# Patient Record
Sex: FEMALE | Race: WHITE
[De-identification: names, ages, dates, MRNs, and addresses within clinical notes are randomized per-mention and may not be internally consistent; named-entity substitution may affect disease eponyms.]

---

## 2020-11-12 ENCOUNTER — TRANSCRIPTION ENCOUNTER (OUTPATIENT)
Age: 25
End: 2020-11-12

## 2021-09-03 ENCOUNTER — EMERGENCY (EMERGENCY)
Facility: HOSPITAL | Age: 26
LOS: 0 days | Discharge: HOME | End: 2021-09-03
Attending: STUDENT IN AN ORGANIZED HEALTH CARE EDUCATION/TRAINING PROGRAM
Payer: OTHER MISCELLANEOUS

## 2021-09-03 VITALS
OXYGEN SATURATION: 98 % | RESPIRATION RATE: 16 BRPM | HEART RATE: 64 BPM | DIASTOLIC BLOOD PRESSURE: 82 MMHG | TEMPERATURE: 98 F | SYSTOLIC BLOOD PRESSURE: 138 MMHG

## 2021-09-03 VITALS — HEIGHT: 65 IN | WEIGHT: 199.96 LBS

## 2021-09-03 DIAGNOSIS — S01.111A LACERATION WITHOUT FOREIGN BODY OF RIGHT EYELID AND PERIOCULAR AREA, INITIAL ENCOUNTER: ICD-10-CM
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
## 2021-09-03 PROCEDURE — 99284 EMERGENCY DEPT VISIT MOD MDM: CPT

## 2021-09-03 PROCEDURE — 12051 INTMD RPR FACE/MM 2.5 CM/<: CPT

## 2021-09-03 PROCEDURE — 99284 EMERGENCY DEPT VISIT MOD MDM: CPT | Mod: 25

## 2021-09-03 RX ADMIN — Medication 1 TABLET(S): at 18:30

## 2021-09-03 NOTE — ED STATDOCS - PATIENT PORTAL LINK FT
You can access the FollowMyHealth Patient Portal offered by [facility] by registering at the following website: [email or web address]. By joining [employer]’s FollowMyHealth portal, you will also be able to view your health information using other applications (apps) compatible with our system.

## 2021-09-03 NOTE — ED ADULT NURSE REASSESSMENT NOTE - NS ED NURSE REASSESS COMMENT FT1
handoff report taken from day RN Marvin. pt. noted resting comfortably in stretcher. no needs at this time. No distress noted, denies pain, safety maintained. WCLITO

## 2021-09-03 NOTE — ED STATDOCS - CARE PLAN
Principal Discharge DX:	Dog scratch  Secondary Diagnosis:	Laceration, eyelid, right  Secondary Diagnosis:	Abrasion of face   1

## 2021-09-03 NOTE — ED STATDOCS - SKIN, MLM
+abrasion to forehead, +3cm laceration on right eyelid, vertical +abrasion to forehead, +2cm laceration on right upper eyelid.

## 2021-09-03 NOTE — ED STATDOCS - NORMAL, MLM
From: Ge Maya  To: Mary Jo Mo MD  Sent: 1/24/2019 9:37 AM CST  Subject: Other    Good Morning! I am writing because I have a question. Im having a lot of pain in my right quad that goes into my groin. I'm hoping I just pulled a muscle now that I'm exercising, but I'm scared to death it's my hip replacement. Should I be worried? Any advice?    thanks kayla all pertinent systems normal

## 2021-09-03 NOTE — ED STATDOCS - OBJECTIVE STATEMENT
27 y/o female with no pertinent PMHx presents to the ED c/o abrasion to forehead and laceration right eyelid from dog while at work. Pt reports dogs vaccinations were UTD. Vaccinations UTD. [facility]: [facility] [employer].

## 2021-09-03 NOTE — ED STATDOCS - CLINICAL SUMMARY MEDICAL DECISION MAKING FREE TEXT BOX
25 y/o female with facial laceration. Plastic surgery consult. 25 y/o female with facial laceration. Plastic surgery consult.      PA note: LAC repaired by Dr. Houston. Patient re-examined and re-evaluated. Patient feels much better at this time. ED evaluation, Diagnosis and management discussed with the patient in detail. Workup results discussed with ED attending, OK to dc home. Close PMD follow up encouraged.  Strict ED return instructions discussed in detail and patient given the opportunity to ask any questions about their discharge diagnosis and instructions. Patient verbalized understanding. ~ Sanjiv Magallanes PA-C

## 2021-09-03 NOTE — ED STATDOCS - PROGRESS NOTE DETAILS
PA: Patient is a 25 y/o female with no pertinent PMHx who presents to [facility] c/o abrasion to forehead and laceration right eyelid from dog while at work. Pt reports dogs vaccinations were UTD. Vaccinations UTD. [facility] [employer]. Spoke with plastics Dr. Houston, will see patient in ED shortly. ~Sanjiv Magallanes PA-C PA note: LAC repaired by Dr. Houston. Patient re-examined and re-evaluated. Patient feels much better at this time. ED evaluation, Diagnosis and management discussed with the patient in detail. Workup results discussed with ED attending, OK to IA home. Close PMD follow up encouraged.  Strict ED return instructions discussed in detail and patient given the opportunity to ask any questions about their discharge diagnosis and instructions. Patient verbalized understanding. ~ Sanjiv Magallanes PA-C

## 2021-09-03 NOTE — ED STATDOCS - ATTENDING CONTRIBUTION TO CARE
I, Courtney Trammell DO,  performed the initial face to face bedside interview with this patient regarding history of present illness, review of symptoms and relevant past medical, social and family history.  I completed an independent physical examination.  I was the initial provider who evaluated this patient. I have signed out the follow up of any pending tests (i.e. labs, radiological studies) to the ACP.  I have communicated the patient’s plan of care and disposition with the ACP.  The history, relevant review of systems, past medical and surgical history, medical decision making, and physical examination was documented by the scribe in my presence and I attest to the accuracy of the documentation.

## 2021-09-03 NOTE — ED ADULT TRIAGE NOTE - CHIEF COMPLAINT QUOTE
Pt presents to ED for abrasion above right eye and right forehead from dog while I work. Pt reports dogs vaccinations were utd. Bleeding controlled in ED

## 2021-09-03 NOTE — ED STATDOCS - CARE PROVIDER_API CALL
Juan Miguel Parker J  PLASTIC SURGERY  [address], [address]  [place] [address]  Phone: [phone]  Fax: [phone]  Follow Up Time:

## 2021-09-03 NOTE — ED STATDOCS - NSFOLLOWUPINSTRUCTIONS_ED_ALL_ED_FT
FACIAL LACERATION - Ambulatory Care           Facial Laceration    AMBULATORY CARE:    A facial laceration is a tear or cut in the skin. Facial lacerations may be closed within 24 hours of injury.    Seek care immediately if:   •You have a fever and the wound is painful, warm, or swollen. The wound area may be red, or fluid may come out of it.      •You have heavy bleeding or bleeding that does not stop after 10 minutes of holding firm, direct pressure over the wound.      Call your doctor if:   •Your wound reopens or your tape comes off.      •Your wound is very painful.      •Your wound is not healing, or you think there is an object in the wound.      •The skin around your wound stays numb.      •You have questions or concerns about your condition or care.      Medicines:   •Antibiotics may be given to prevent an infection if your wound was deep and had to be cleaned out.       •Take your medicine as directed. Contact your healthcare provider if you think your medicine is not helping or if you have side effects. Tell him of her if you are allergic to any medicine. Keep a list of the medicines, vitamins, and herbs you take. Include the amounts, and when and why you take them. Bring the list or the pill bottles to follow-up visits. Carry your medicine list with you in case of an emergency.      Care for your wound: Care for your wound as directed to prevent infection and help it heal. Wash your hands with soap and warm water before and after you care for your wound. You may need to keep the wound dry for the first 24 to 48 hours. When your healthcare provider says it is okay, wash around your wound with soap and water, or as directed. Gently pat the area dry. Do not use alcohol or hydrogen peroxide to clean your wound unless you are directed to.  •Do not take aspirin or NSAIDs for 24 hours after being injured. Aspirin and NSAIDs can increase blood flow. Your laceration may continue to bleed.      •Do not take hot showers, eat or drink hot foods and liquids for 48 hours after being injured. Also, do not use a heating pad near your laceration. The heat can cause swelling in and around your laceration.      •If your wound was covered with a bandage, leave your bandage on as long as directed. Bandages keep your wound clean and protected. They can also prevent swelling. Ask when and how to change your bandage. Be careful not to apply the bandage or tape too tightly. This could cut off blood flow and cause more injury.      •If your wound was closed with stitches, keep your wound clean. Your healthcare provider may recommend that you apply antibiotic ointment after you clean your wound.      •If your wound was closed with wound tape or medical strips, keep the area clean and dry. The strips will usually fall off on their own after several days.      •If your wound was closed with tissue glue, do not use any ointments or lotions on the area. You may shower, but do not swim or soak in a bathtub. Gently pat the area dry after you take a shower. Do not pick at or scrub the glue area.      Decrease scarring: The skin in the area of your wound may turn a different color if it is exposed to direct sunlight. After your wound is healed, use sunscreen over the area when you are out in the sun. You should do this for at least 6 months to 1 year after your injury. Some wounds scar less if they are covered while they heal.    Follow up with your doctor as directed: You may need to follow up in 24 to 48 hours to have your wound checked for infection. You may need to return in 3 to 5 days if you have stitches that need to be removed. Write down your questions so you remember to ask them during your visits.                  Log Out.      [employer] CareNotes®     :  [facility]  	                       ABRASION - General Information           Abrasion    WHAT YOU NEED TO KNOW:    What is an abrasion? An abrasion is a wound on your skin. Abrasions usually happen when your skin rubs against a rough surface. Examples of an abrasion include rug burn, a skinned elbow, or road rash. Abrasions can be deep or shallow The wound may hurt, bleed, bruise, or swell.    How can I care for my abrasion?   •Wash your hands and dry them with a clean towel first.      •Press a clean cloth against your wound for 5 to 10 minutes to stop any bleeding.      •Rinse your wound with clean water. Do not use harsh soap, alcohol, or iodine solutions.      •Use a clean, wet cloth to remove any objects, such as small pieces of rocks or dirt.      •Rub antibiotic ointment on your wound. This may help prevent infection and help your wound heal.      •Cover the wound with a non-stick bandage. Change the bandage daily, and if it gets wet or dirty.       When should I seek immediate care?   •The bleeding does not stop after 10 minutes of firm pressure.      •The redness around your wound begins to spread.      •You cannot rinse one or more foreign objects out of your wound.      When should I call my doctor?   •You have a fever or chills.       •Your abrasion is red, warm, swollen, or draining pus.      •You have questions or concerns about your condition or care.      CARE AGREEMENT:    You have the right to help plan your care. Learn about your health condition and how it may be treated. Discuss treatment options with your healthcare providers to decide what care you want to receive. You always have the right to refuse treatment.

## 2021-09-03 NOTE — ED STATDOCS - PHYSICAL EXAMINATION
PA NOTE: GEN: AOX3, NAD. HEENT: Throat clear. Airway is patent. EYES: PERRLA. EOMI. Head: NC/AT. FACE: +2.0cm superficial linear LAC noted right upper eyelid. No active bleeding. No obvious eye injury. No eye FB's. NECK: Supple, No JVD. FROM. C-spine non-tender. CV:S1S2, RRR, LUNGS: Non-labored breathing, no tachypnea. O2sat 100% RA. CTA b/l. No w/r/r. CHEST: Equal chest expansion and rise. No deformity. ABD: Soft, NT/ND, no rebound, no guarding. No CVAT. EXT: No e/c/c. 2+ distal pulses. SKIN: No rashes. NEURO: No focal deficits. CN II-XII intact. FROM. 5/5 motor and sensory. ~Sanjiv Magallanes PA-C

## 2023-07-12 ENCOUNTER — EMERGENCY (EMERGENCY)
Facility: HOSPITAL | Age: 28
LOS: 0 days | Discharge: HOME | End: 2023-07-12
Attending: EMERGENCY MEDICINE
Payer: COMMERCIAL

## 2023-07-12 VITALS — HEIGHT: 65 IN | WEIGHT: 175.05 LBS

## 2023-07-12 VITALS
DIASTOLIC BLOOD PRESSURE: 90 MMHG | RESPIRATION RATE: 18 BRPM | SYSTOLIC BLOOD PRESSURE: 130 MMHG | TEMPERATURE: 98 F | OXYGEN SATURATION: 100 % | HEART RATE: 79 BPM
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
## 2023-07-12 DIAGNOSIS — S51.831A PUNCTURE WOUND WITHOUT FOREIGN BODY OF RIGHT FOREARM, INITIAL ENCOUNTER: ICD-10-CM
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
## 2023-07-12 PROCEDURE — 99283 EMERGENCY DEPT VISIT LOW MDM: CPT

## 2023-07-12 PROCEDURE — 99284 EMERGENCY DEPT VISIT MOD MDM: CPT

## 2023-07-12 RX ADMIN — Medication 1 TABLET(S): at 18:46

## 2023-07-12 NOTE — ED STATDOCS - CARE PROVIDER_API CALL
Melvin Cole.  Orthopaedic Surgery  [address] [place] [address]  Phone: [phone]  Fax: [phone]  Follow Up Time:

## 2023-07-12 NOTE — ED ADULT NURSE NOTE - NSFALLUNIVINTERV_ED_ALL_ED
Bed/Stretcher in lowest position, wheels locked, appropriate side rails in place/Call bell, personal items and telephone in reach/Instruct patient to call for assistance before getting out of bed/chair/stretcher/Non-slip footwear applied when patient is off stretcher/[place] to call system/Physically safe environment - no spills, clutter or unnecessary equipment/Purposeful proactive rounding/Room/bathroom lighting operational, light cord in reach

## 2023-07-12 NOTE — ED STATDOCS - PATIENT PORTAL LINK FT
Owen SMITH
You can access the FollowMyHealth Patient Portal offered by [facility] by registering at the following website: [email or web address]. By joining [employer]’s FollowMyHealth portal, you will also be able to view your health information using other applications (apps) compatible with our system.

## 2023-07-12 NOTE — ED STATDOCS - NSFOLLOWUPINSTRUCTIONS_ED_ALL_ED_FT
FOLLOW UP WITH THE ORTHOPEDIC DOCTOR AS NEEDED. CALL THE OFFICE TO MAKE AN APPOINTMENT. RETURN TO ER FOR ANY WORSENING SYMPTOMS OR NEW CONCERNS.       Animal Bite    WHAT YOU NEED TO KNOW:    Animal bite injuries range from shallow cuts to deep, life-threatening wounds. An animal can cut or puncture the skin when it bites. Your skin may be torn from your body. Your skin may swell or bruise even if the bite does not break the skin. Animal bites occur more often on the hands, arms, legs, and face. Bites from dogs and cats are the most common injuries.    DISCHARGE INSTRUCTIONS:    Return to the emergency department if:     You have a fever.      Your wound is red, swollen, and draining pus.      You see red streaks on the skin around the wound.      You can no longer move the bitten area.      Your heartbeat and breathing are much faster than usual.      You feel dizzy and confused.    Contact your healthcare provider if:     Your pain does not get better, even after you take pain medicine.      You have nightmares or flashbacks about the animal bite.       You have questions or concerns about your condition or care.    Medicines: You may need any of the following:     Antibiotics prevent or treat a bacterial infection.      Prescription pain medicine may be given. Ask how to take this medicine safely.      A tetanus vaccine may be needed to prevent tetanus. Tetanus is a life-threatening bacterial infection that affects the nerves and muscles. The bacteria can be spread through animal bites.       A rabies vaccine may be needed to prevent rabies. Rabies is a life-threatening viral infection. The virus can be spread through animal bites.      Take your medicine as directed. Contact your healthcare provider if you think your medicine is not helping or if you have side effects. Tell him of her if you are allergic to any medicine. Keep a list of the medicines, vitamins, and herbs you take. Include the amounts, and when and why you take them. Bring the list or the pill bottles to follow-up visits. Carry your medicine list with you in case of an emergency.    Follow up with your healthcare provider in 1 to 2 days: You may need to return to have your stitches removed. Write down your questions so you remember to ask them during your visits.    Self-care:     Apply antibiotic ointment as directed. This helps prevent infection in minor skin wounds. It is available without a doctor's order.      Keep the wound clean and covered. Wash the wound every day with soap and water or germ-killing cleanser. Ask your healthcare provider about the kinds of bandages to use.            Apply ice on your wound. Ice helps decrease swelling and pain. Ice may also help prevent tissue damage. Use an ice pack, or put crushed ice in a plastic bag. Cover it with a towel and place it on your wound for 15 to 20 minutes every hour or as directed.      Elevate the wound area. Raise your wound above the level of your heart as often as you can. This will help decrease swelling and pain. Prop your wound on pillows or blankets to keep it elevated comfortably.     Prevent another animal bite:     Learn to recognize the signs of a scared or angry pet. Avoid quick, sudden movements.      Do not step between animals that are fighting.      Do not leave a pet alone with a young child.      Do not disturb an animal while it eats, sleeps, or cares for its young.      Do not approach an animal you do not know, especially one that is tied up or caged.      Stay away from animals that seem sick or act strangely.      Do not feed or capture wild animals.

## 2023-07-12 NOTE — ED ADULT TRIAGE NOTE - CHIEF COMPLAINT QUOTE
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 Dog UTD on vaccines. Washed bite with chlorhexidine scrub. No other complaints.

## 2023-07-12 NOTE — ED STATDOCS - CLINICAL SUMMARY MEDICAL DECISION MAKING FREE TEXT BOX
signed Amanda Hernández PA-C Pt seen initially in intake by Dr Streeter.   28F with dog bit to right forearm while she was restraining a stressed [place] [employer] dog at work [employer]. Pt with puncture wounds of forearm, no suturing indicated. wounds cleansed in ED, abx ppx. return precautions given. Pt feeling well at DC, agrees with DC and plan of care.

## 2023-07-12 NOTE — ED STATDOCS - OBJECTIVE STATEMENT
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 Dog's immunizations UTD. Tetanus shot UTD. NKDA.

## 2023-07-12 NOTE — ED STATDOCS - NS ED ATTENDING STATEMENT MOD
This was a shared visit with the TUNG. I reviewed and verified the documentation and independently performed the documented:

## 2023-07-13 PROBLEM — Z78.9 OTHER SPECIFIED HEALTH STATUS: Chronic | Status: ACTIVE | Noted: 2021-09-04

## 2024-09-16 ENCOUNTER — APPOINTMENT (OUTPATIENT)
Dept: OBGYN | Facility: CLINIC | Age: 29
End: 2024-09-16
Payer: COMMERCIAL

## 2024-09-16 VITALS
HEIGHT: 65 IN | WEIGHT: 170 LBS | BODY MASS INDEX: 28.32 KG/M2 | DIASTOLIC BLOOD PRESSURE: 72 MMHG | SYSTOLIC BLOOD PRESSURE: 110 MMHG

## 2024-09-16 DIAGNOSIS — Z78.9 OTHER SPECIFIED HEALTH STATUS: ICD-10-CM

## 2024-09-16 DIAGNOSIS — M05.9 RHEUMATOID ARTHRITIS WITH RHEUMATOID FACTOR, UNSPECIFIED: ICD-10-CM

## 2024-09-16 DIAGNOSIS — E28.2 POLYCYSTIC OVARIAN SYNDROME: ICD-10-CM

## 2024-09-16 DIAGNOSIS — Z80.8 FAMILY HISTORY OF MALIGNANT NEOPLASM OF OTHER ORGANS OR SYSTEMS: ICD-10-CM

## 2024-09-16 DIAGNOSIS — Z01.419 ENCOUNTER FOR GYNECOLOGICAL EXAMINATION (GENERAL) (ROUTINE) W/OUT ABNORMAL FINDINGS: ICD-10-CM

## 2024-09-16 DIAGNOSIS — Z82.79 FAMILY HISTORY OF OTHER CONGENITAL MALFORMATIONS, DEFORMATIONS AND CHROMOSOMAL ABNORMALITIES: ICD-10-CM

## 2024-09-16 DIAGNOSIS — Z82.0 FAMILY HISTORY OF EPILEPSY AND OTHER DISEASES OF THE NERVOUS SYSTEM: ICD-10-CM

## 2024-09-16 DIAGNOSIS — Z80.52 FAMILY HISTORY OF MALIGNANT NEOPLASM OF BLADDER: ICD-10-CM

## 2024-09-16 DIAGNOSIS — Z82.5 FAMILY HISTORY OF ASTHMA AND OTHER CHRONIC LOWER RESPIRATORY DISEASES: ICD-10-CM

## 2024-09-16 PROCEDURE — 99385 PREV VISIT NEW AGE 18-39: CPT

## 2024-09-16 PROCEDURE — 99459 PELVIC EXAMINATION: CPT

## 2024-09-16 NOTE — HISTORY OF PRESENT ILLNESS
[IUD] : has an intrauterine device [Y] : Patient is sexually active [N] : Patient denies prior pregnancies [Menarche Age: ____] : age at menarche was [unfilled] [FreeTextEntry1] : 29 y.o P0 (LMP 7/2024- Jorge) presenting for gyn annual  Patient overall doing well. No acute GYN complaints today.  [facility] by [facility] 2021 for [facility].  Jorge IUD was placed at that time.  Reports irregular spotting related to IUD, denies regular menses.  Denies any pelvic cramping pain or urinary complaints.  Sexually active with male partner-he lives in [place].  Screening: - Pap - unsure  ObHx: denies GynHx: Hx of PCOS. Denies hx of fibroids, hx of endometriosis, hx of STD's PMH: PCOS, rheumatoid arthritis  PSH: Right ankle & left foot surgery  ALL: NKDA SocHx: Lives with family. Feels safe at home. Denies depression or anxiety related symptoms. Social ETOH use. Never used tobacco products, denies illicit drug use. FamHx: Melanoma-mother, maternal aunt-bladder cancer, Father-alzheimers [de-identified] : liletta 05/13/2021 [PGHxTotal] : 0 [PGHxFullTerm] : 0 [PGHxPremature] : 0 [PGHxAbortions] : 0 [[facility] Center[facility]Living] : 0 [PGHxABInduced] : 0 [PGHxABSpont] : 0 [PGHxEctopic] : 0 [PGHxMultBirths] : 0

## 2024-09-16 NOTE — PHYSICAL EXAM
[Chaperone Present] : A chaperone was present in the examining room during all aspects of the physical examination [70311] : A chaperone was present during the pelvic exam. [Appropriately responsive] : appropriately responsive [Alert] : alert [No Acute Distress] : no acute distress [Soft] : soft [Non-tender] : non-tender [No Lesions] : no lesions [No Mass] : no mass [Oriented x3] : oriented x3 [Examination Of The Breasts] : a normal appearance [No Masses] : no breast masses were palpable [Labia Majora] : normal [Labia Minora] : normal [IUD String] : an IUD string was noted [Normal] : normal [Uterine Adnexae] : normal

## 2024-09-16 NOTE — DISCUSSION/SUMMARY
[FreeTextEntry1] : 29 y.o P0 (LMP 7/2024- Jorge) presenting for gyn annual  PCP: Referral placed   #Well Woman Visit  1. Nutrition/Activity: The benefits of physical activity and balanced diet.  2. Health Screening: She was informed of the benefits of a screening Pap. - Cervix: We reviewed ASCCP/ACOG guidelines for pap smear screening. PAP collected today. 3. Sex Health: The importance of safe-sex practices was discussed with the patient. STD screening was offered to patient, she accepts g/c testing 4. Contraception: Liletta IUD placed 2021, IUD strings visualized today. 5. Denies any hx of DM/HTN 6. Has received Gardasil vaccination series       She verbalized understanding and agreement with above counseling regarding differential diagnosis, evaluation, and plan. She was given time for questions/concerns which were all answered to her apparent satisfaction.    RTO in 1 year for annual

## 2024-09-16 NOTE — COUNSELING
[Nutrition/ Exercise/ Weight Management] : nutrition, exercise, weight management [Body Image] : body image [Vitamins/Supplements] : vitamins/supplements [Breast Self Exam] : breast self exam [Bladder Hygiene] : bladder hygiene [Confidentiality] : confidentiality [STD (testing, results, tx)] : STD (testing, results, tx) [HPV Vaccine] : HPV Vaccine

## 2024-09-17 LAB
C TRACH RRNA SPEC QL NAA+PROBE: NOT DETECTED
N GONORRHOEA RRNA SPEC QL NAA+PROBE: NOT DETECTED
SOURCE TP AMPLIFICATION: NORMAL

## 2024-09-21 LAB — CYTOLOGY CVX/VAG DOC THIN PREP: NORMAL

## 2024-09-30 ENCOUNTER — TRANSCRIPTION ENCOUNTER (OUTPATIENT)
Age: 29
End: 2024-09-30

## 2024-11-01 ENCOUNTER — APPOINTMENT (OUTPATIENT)
Dept: FAMILY MEDICINE | Facility: CLINIC | Age: 29
End: 2024-11-01
Payer: COMMERCIAL

## 2024-11-01 ENCOUNTER — NON-APPOINTMENT (OUTPATIENT)
Age: 29
End: 2024-11-01

## 2024-11-01 VITALS
WEIGHT: 175.5 LBS | OXYGEN SATURATION: 99 % | BODY MASS INDEX: 29.24 KG/M2 | HEIGHT: 65 IN | HEART RATE: 114 BPM | SYSTOLIC BLOOD PRESSURE: 128 MMHG | TEMPERATURE: 98.5 F | DIASTOLIC BLOOD PRESSURE: 74 MMHG

## 2024-11-01 DIAGNOSIS — Z13.29 ENCOUNTER FOR SCREENING FOR OTHER SUSPECTED ENDOCRINE DISORDER: ICD-10-CM

## 2024-11-01 DIAGNOSIS — Z13.228 ENCOUNTER FOR SCREENING FOR OTHER SUSPECTED ENDOCRINE DISORDER: ICD-10-CM

## 2024-11-01 DIAGNOSIS — Z13.0 ENCOUNTER FOR SCREENING FOR OTHER SUSPECTED ENDOCRINE DISORDER: ICD-10-CM

## 2024-11-01 DIAGNOSIS — F41.9 ANXIETY DISORDER, UNSPECIFIED: ICD-10-CM

## 2024-11-01 DIAGNOSIS — F32.A ANXIETY DISORDER, UNSPECIFIED: ICD-10-CM

## 2024-11-01 DIAGNOSIS — Z78.9 OTHER SPECIFIED HEALTH STATUS: ICD-10-CM

## 2024-11-01 DIAGNOSIS — Z00.00 ENCOUNTER FOR GENERAL ADULT MEDICAL EXAMINATION W/OUT ABNORMAL FINDINGS: ICD-10-CM

## 2024-11-01 DIAGNOSIS — Z80.43 FAMILY HISTORY OF MALIGNANT NEOPLASM OF TESTIS: ICD-10-CM

## 2024-11-01 DIAGNOSIS — Z13.220 ENCOUNTER FOR SCREENING FOR LIPOID DISORDERS: ICD-10-CM

## 2024-11-01 PROCEDURE — 99385 PREV VISIT NEW AGE 18-39: CPT

## 2024-11-01 RX ORDER — FLUOXETINE HYDROCHLORIDE 20 MG/1
20 TABLET ORAL
Qty: 90 | Refills: 0 | Status: DISCONTINUED | COMMUNITY
Start: 2024-11-01 | End: 2024-11-01

## 2024-11-01 RX ORDER — FLUOXETINE HYDROCHLORIDE 20 MG/1
20 CAPSULE ORAL
Qty: 90 | Refills: 0 | Status: ACTIVE | COMMUNITY
Start: 2024-11-01 | End: 1900-01-01

## 2024-11-02 LAB
ALBUMIN SERPL ELPH-MCNC: 4.8 G/DL
ALP BLD-CCNC: 56 U/L
ALT SERPL-CCNC: 10 U/L
ANION GAP SERPL CALC-SCNC: 14 MMOL/L
AST SERPL-CCNC: 12 U/L
BASOPHILS # BLD AUTO: 0.05 K/UL
BASOPHILS NFR BLD AUTO: 0.8 %
BILIRUB SERPL-MCNC: 0.2 MG/DL
BUN SERPL-MCNC: 7 MG/DL
CALCIUM SERPL-MCNC: 9.3 MG/DL
CHLORIDE SERPL-SCNC: 101 MMOL/L
CHOLEST SERPL-MCNC: 156 MG/DL
CO2 SERPL-SCNC: 26 MMOL/L
CREAT SERPL-MCNC: 0.74 MG/DL
EGFR: 113 ML/MIN/1.73M2
EOSINOPHIL # BLD AUTO: 0.13 K/UL
EOSINOPHIL NFR BLD AUTO: 2.1 %
ESTIMATED AVERAGE GLUCOSE: 105 MG/DL
GLUCOSE SERPL-MCNC: 100 MG/DL
HBA1C MFR BLD HPLC: 5.3 %
HCT VFR BLD CALC: 42.5 %
HDLC SERPL-MCNC: 51 MG/DL
HGB BLD-MCNC: 13.8 G/DL
IMM GRANULOCYTES NFR BLD AUTO: 0.2 %
LDLC SERPL CALC-MCNC: 84 MG/DL
LYMPHOCYTES # BLD AUTO: 2.1 K/UL
LYMPHOCYTES NFR BLD AUTO: 34.3 %
MAN DIFF?: NORMAL
MCHC RBC-ENTMCNC: 29.7 PG
MCHC RBC-ENTMCNC: 32.5 G/DL
MCV RBC AUTO: 91.4 FL
MONOCYTES # BLD AUTO: 0.46 K/UL
MONOCYTES NFR BLD AUTO: 7.5 %
NEUTROPHILS # BLD AUTO: 3.37 K/UL
NEUTROPHILS NFR BLD AUTO: 55.1 %
NONHDLC SERPL-MCNC: 105 MG/DL
PLATELET # BLD AUTO: 342 K/UL
POTASSIUM SERPL-SCNC: 4 MMOL/L
PROT SERPL-MCNC: 7.4 G/DL
RBC # BLD: 4.65 M/UL
RBC # FLD: 13.2 %
SODIUM SERPL-SCNC: 140 MMOL/L
TRIGL SERPL-MCNC: 117 MG/DL
TSH SERPL-ACNC: 1.4 UIU/ML
WBC # FLD AUTO: 6.12 K/UL

## 2024-12-05 ENCOUNTER — APPOINTMENT (OUTPATIENT)
Dept: FAMILY MEDICINE | Facility: CLINIC | Age: 29
End: 2024-12-05

## 2024-12-10 ENCOUNTER — TRANSCRIPTION ENCOUNTER (OUTPATIENT)
Age: 29
End: 2024-12-10

## 2024-12-13 ENCOUNTER — APPOINTMENT (OUTPATIENT)
Dept: FAMILY MEDICINE | Facility: CLINIC | Age: 29
End: 2024-12-13
Payer: COMMERCIAL

## 2024-12-13 VITALS
WEIGHT: 167 LBS | DIASTOLIC BLOOD PRESSURE: 70 MMHG | HEIGHT: 65 IN | BODY MASS INDEX: 27.82 KG/M2 | TEMPERATURE: 98.1 F | HEART RATE: 98 BPM | OXYGEN SATURATION: 98 % | SYSTOLIC BLOOD PRESSURE: 124 MMHG

## 2024-12-13 DIAGNOSIS — F32.A ANXIETY DISORDER, UNSPECIFIED: ICD-10-CM

## 2024-12-13 DIAGNOSIS — F41.9 ANXIETY DISORDER, UNSPECIFIED: ICD-10-CM

## 2024-12-13 DIAGNOSIS — F98.8 OTHER SPECIFIED BEHAVIORAL AND EMOTIONAL DISORDERS WITH ONSET USUALLY OCCURRING IN CHILDHOOD AND ADOLESCENCE: ICD-10-CM

## 2024-12-13 PROCEDURE — 99214 OFFICE O/P EST MOD 30 MIN: CPT

## 2024-12-13 RX ORDER — DEXTROAMPHETAMINE SACCHARATE, AMPHETAMINE ASPARTATE, DEXTROAMPHETAMINE SULFATE AND AMPHETAMINE SULFATE 2.5; 2.5; 2.5; 2.5 MG/1; MG/1; MG/1; MG/1
10 TABLET ORAL DAILY
Qty: 30 | Refills: 0 | Status: ACTIVE | COMMUNITY
Start: 2024-12-13 | End: 1900-01-01

## 2024-12-13 RX ORDER — FLUOXETINE HYDROCHLORIDE 40 MG/1
40 CAPSULE ORAL
Qty: 90 | Refills: 0 | Status: ACTIVE | COMMUNITY
Start: 2024-12-13 | End: 1900-01-01

## 2024-12-16 ENCOUNTER — TRANSCRIPTION ENCOUNTER (OUTPATIENT)
Age: 29
End: 2024-12-16

## 2025-01-13 ENCOUNTER — APPOINTMENT (OUTPATIENT)
Dept: PSYCHIATRY | Facility: CLINIC | Age: 30
End: 2025-01-13
Payer: COMMERCIAL

## 2025-01-13 PROCEDURE — 90791 PSYCH DIAGNOSTIC EVALUATION: CPT | Mod: 95

## 2025-01-22 ENCOUNTER — APPOINTMENT (OUTPATIENT)
Dept: FAMILY MEDICINE | Facility: CLINIC | Age: 30
End: 2025-01-22

## 2025-01-22 VITALS
TEMPERATURE: 97.4 F | DIASTOLIC BLOOD PRESSURE: 74 MMHG | HEIGHT: 65 IN | HEART RATE: 78 BPM | SYSTOLIC BLOOD PRESSURE: 124 MMHG | BODY MASS INDEX: 27.32 KG/M2 | OXYGEN SATURATION: 98 % | WEIGHT: 164 LBS

## 2025-01-22 DIAGNOSIS — Z12.83 ENCOUNTER FOR SCREENING FOR MALIGNANT NEOPLASM OF SKIN: ICD-10-CM

## 2025-01-22 DIAGNOSIS — Z02.89 ENCOUNTER FOR OTHER ADMINISTRATIVE EXAMINATIONS: ICD-10-CM

## 2025-01-22 DIAGNOSIS — Z82.49 FAMILY HISTORY OF ISCHEMIC HEART DISEASE AND OTHER DISEASES OF THE CIRCULATORY SYSTEM: ICD-10-CM

## 2025-01-22 DIAGNOSIS — Z23 ENCOUNTER FOR IMMUNIZATION: ICD-10-CM

## 2025-01-22 DIAGNOSIS — F41.9 ANXIETY DISORDER, UNSPECIFIED: ICD-10-CM

## 2025-01-22 DIAGNOSIS — F32.A ANXIETY DISORDER, UNSPECIFIED: ICD-10-CM

## 2025-01-22 DIAGNOSIS — F98.8 OTHER SPECIFIED BEHAVIORAL AND EMOTIONAL DISORDERS WITH ONSET USUALLY OCCURRING IN CHILDHOOD AND ADOLESCENCE: ICD-10-CM

## 2025-01-22 PROCEDURE — G0008: CPT

## 2025-01-22 PROCEDURE — 90656 IIV3 VACC NO PRSV 0.5 ML IM: CPT

## 2025-01-22 PROCEDURE — 99214 OFFICE O/P EST MOD 30 MIN: CPT | Mod: 25

## 2025-01-22 RX ORDER — DEXTROAMPHETAMINE SACCHARATE, AMPHETAMINE ASPARTATE MONOHYDRATE, DEXTROAMPHETAMINE SULFATE AND AMPHETAMINE SULFATE 3.75; 3.75; 3.75; 3.75 MG/1; MG/1; MG/1; MG/1
15 CAPSULE, EXTENDED RELEASE ORAL
Qty: 30 | Refills: 0 | Status: ACTIVE | COMMUNITY
Start: 2025-01-22 | End: 1900-01-01

## 2025-01-23 ENCOUNTER — RX RENEWAL (OUTPATIENT)
Age: 30
End: 2025-01-23

## 2025-02-26 ENCOUNTER — APPOINTMENT (OUTPATIENT)
Dept: FAMILY MEDICINE | Facility: CLINIC | Age: 30
End: 2025-02-26
Payer: COMMERCIAL

## 2025-02-26 VITALS
DIASTOLIC BLOOD PRESSURE: 74 MMHG | BODY MASS INDEX: 27.49 KG/M2 | WEIGHT: 165 LBS | HEART RATE: 89 BPM | SYSTOLIC BLOOD PRESSURE: 120 MMHG | TEMPERATURE: 98.1 F | HEIGHT: 65 IN | OXYGEN SATURATION: 98 %

## 2025-02-26 DIAGNOSIS — F32.A ANXIETY DISORDER, UNSPECIFIED: ICD-10-CM

## 2025-02-26 DIAGNOSIS — F41.9 ANXIETY DISORDER, UNSPECIFIED: ICD-10-CM

## 2025-02-26 DIAGNOSIS — F98.8 OTHER SPECIFIED BEHAVIORAL AND EMOTIONAL DISORDERS WITH ONSET USUALLY OCCURRING IN CHILDHOOD AND ADOLESCENCE: ICD-10-CM

## 2025-02-26 PROCEDURE — G2211 COMPLEX E/M VISIT ADD ON: CPT

## 2025-02-26 PROCEDURE — 99213 OFFICE O/P EST LOW 20 MIN: CPT

## 2025-02-28 ENCOUNTER — TRANSCRIPTION ENCOUNTER (OUTPATIENT)
Age: 30
End: 2025-02-28

## 2025-03-10 ENCOUNTER — TRANSCRIPTION ENCOUNTER (OUTPATIENT)
Age: 30
End: 2025-03-10

## 2025-03-10 ENCOUNTER — APPOINTMENT (OUTPATIENT)
Dept: OBGYN | Facility: CLINIC | Age: 30
End: 2025-03-10
Payer: COMMERCIAL

## 2025-03-10 VITALS
BODY MASS INDEX: 27.49 KG/M2 | SYSTOLIC BLOOD PRESSURE: 122 MMHG | DIASTOLIC BLOOD PRESSURE: 82 MMHG | HEIGHT: 65 IN | WEIGHT: 165 LBS

## 2025-03-10 DIAGNOSIS — Z30.430 ENCOUNTER FOR INSERTION OF INTRAUTERINE CONTRACEPTIVE DEVICE: ICD-10-CM

## 2025-03-10 PROCEDURE — 58301 REMOVE INTRAUTERINE DEVICE: CPT

## 2025-03-10 PROCEDURE — 81025 URINE PREGNANCY TEST: CPT

## 2025-03-10 PROCEDURE — 58300 INSERT INTRAUTERINE DEVICE: CPT

## 2025-03-11 ENCOUNTER — APPOINTMENT (OUTPATIENT)
Dept: DERMATOLOGY | Facility: CLINIC | Age: 30
End: 2025-03-11
Payer: COMMERCIAL

## 2025-03-11 LAB
HCG UR QL: NEGATIVE
QUALITY CONTROL: YES

## 2025-03-11 PROCEDURE — 10040 EXTRACTION: CPT

## 2025-03-11 PROCEDURE — 99204 OFFICE O/P NEW MOD 45 MIN: CPT | Mod: 25

## 2025-03-13 ENCOUNTER — TRANSCRIPTION ENCOUNTER (OUTPATIENT)
Age: 30
End: 2025-03-13

## 2025-03-18 RX ORDER — DEXTROAMPHETAMINE SACCHARATE, AMPHETAMINE ASPARTATE, DEXTROAMPHETAMINE SULFATE AND AMPHETAMINE SULFATE 3.75; 3.75; 3.75; 3.75 MG/1; MG/1; MG/1; MG/1
15 TABLET ORAL DAILY
Qty: 60 | Refills: 0 | Status: ACTIVE | COMMUNITY
Start: 2025-03-18 | End: 1900-01-01

## 2025-03-24 ENCOUNTER — APPOINTMENT (OUTPATIENT)
Dept: ULTRASOUND IMAGING | Facility: CLINIC | Age: 30
End: 2025-03-24
Payer: COMMERCIAL

## 2025-03-24 ENCOUNTER — OUTPATIENT (OUTPATIENT)
Dept: OUTPATIENT SERVICES | Facility: HOSPITAL | Age: 30
LOS: 1 days | End: 2025-03-24
Payer: COMMERCIAL

## 2025-03-24 DIAGNOSIS — Z30.430 ENCOUNTER FOR INSERTION OF INTRAUTERINE CONTRACEPTIVE DEVICE: ICD-10-CM

## 2025-03-24 PROCEDURE — 76830 TRANSVAGINAL US NON-OB: CPT | Mod: 26

## 2025-03-24 PROCEDURE — 76830 TRANSVAGINAL US NON-OB: CPT

## 2025-03-25 ENCOUNTER — APPOINTMENT (OUTPATIENT)
Dept: FAMILY MEDICINE | Facility: CLINIC | Age: 30
End: 2025-03-25
Payer: COMMERCIAL

## 2025-03-25 VITALS
TEMPERATURE: 97.1 F | HEIGHT: 65 IN | SYSTOLIC BLOOD PRESSURE: 122 MMHG | BODY MASS INDEX: 30.32 KG/M2 | OXYGEN SATURATION: 97 % | HEART RATE: 99 BPM | DIASTOLIC BLOOD PRESSURE: 80 MMHG | WEIGHT: 182 LBS

## 2025-03-25 DIAGNOSIS — F98.8 OTHER SPECIFIED BEHAVIORAL AND EMOTIONAL DISORDERS WITH ONSET USUALLY OCCURRING IN CHILDHOOD AND ADOLESCENCE: ICD-10-CM

## 2025-03-25 PROCEDURE — G2211 COMPLEX E/M VISIT ADD ON: CPT

## 2025-03-25 PROCEDURE — 99213 OFFICE O/P EST LOW 20 MIN: CPT

## 2025-03-28 ENCOUNTER — TRANSCRIPTION ENCOUNTER (OUTPATIENT)
Age: 30
End: 2025-03-28

## 2025-04-25 ENCOUNTER — APPOINTMENT (OUTPATIENT)
Dept: FAMILY MEDICINE | Facility: CLINIC | Age: 30
End: 2025-04-25
Payer: COMMERCIAL

## 2025-04-25 VITALS
HEIGHT: 65 IN | TEMPERATURE: 98.7 F | DIASTOLIC BLOOD PRESSURE: 72 MMHG | WEIGHT: 182 LBS | SYSTOLIC BLOOD PRESSURE: 108 MMHG | BODY MASS INDEX: 30.32 KG/M2 | OXYGEN SATURATION: 98 % | HEART RATE: 66 BPM

## 2025-04-25 DIAGNOSIS — F98.8 OTHER SPECIFIED BEHAVIORAL AND EMOTIONAL DISORDERS WITH ONSET USUALLY OCCURRING IN CHILDHOOD AND ADOLESCENCE: ICD-10-CM

## 2025-04-25 PROCEDURE — G2211 COMPLEX E/M VISIT ADD ON: CPT

## 2025-04-25 PROCEDURE — 99213 OFFICE O/P EST LOW 20 MIN: CPT

## 2025-04-29 ENCOUNTER — APPOINTMENT (OUTPATIENT)
Dept: DERMATOLOGY | Facility: CLINIC | Age: 30
End: 2025-04-29

## 2025-05-27 ENCOUNTER — APPOINTMENT (OUTPATIENT)
Dept: FAMILY MEDICINE | Facility: CLINIC | Age: 30
End: 2025-05-27
Payer: COMMERCIAL

## 2025-05-27 VITALS
SYSTOLIC BLOOD PRESSURE: 114 MMHG | DIASTOLIC BLOOD PRESSURE: 70 MMHG | TEMPERATURE: 98.3 F | BODY MASS INDEX: 30.82 KG/M2 | OXYGEN SATURATION: 98 % | HEART RATE: 100 BPM | HEIGHT: 65 IN | WEIGHT: 185 LBS

## 2025-05-27 DIAGNOSIS — F98.8 OTHER SPECIFIED BEHAVIORAL AND EMOTIONAL DISORDERS WITH ONSET USUALLY OCCURRING IN CHILDHOOD AND ADOLESCENCE: ICD-10-CM

## 2025-05-27 PROCEDURE — 99213 OFFICE O/P EST LOW 20 MIN: CPT

## 2025-05-27 PROCEDURE — G2211 COMPLEX E/M VISIT ADD ON: CPT

## 2025-06-30 ENCOUNTER — TRANSCRIPTION ENCOUNTER (OUTPATIENT)
Age: 30
End: 2025-06-30

## 2025-07-30 ENCOUNTER — NON-APPOINTMENT (OUTPATIENT)
Age: 30
End: 2025-07-30

## 2025-08-27 ENCOUNTER — APPOINTMENT (OUTPATIENT)
Dept: FAMILY MEDICINE | Facility: CLINIC | Age: 30
End: 2025-08-27
Payer: COMMERCIAL

## 2025-08-27 DIAGNOSIS — F98.8 OTHER SPECIFIED BEHAVIORAL AND EMOTIONAL DISORDERS WITH ONSET USUALLY OCCURRING IN CHILDHOOD AND ADOLESCENCE: ICD-10-CM

## 2025-08-27 PROCEDURE — G2211 COMPLEX E/M VISIT ADD ON: CPT | Mod: 95

## 2025-08-27 PROCEDURE — 99213 OFFICE O/P EST LOW 20 MIN: CPT | Mod: 95
